# Patient Record
Sex: MALE | Race: WHITE | NOT HISPANIC OR LATINO | URBAN - NONMETROPOLITAN AREA
[De-identification: names, ages, dates, MRNs, and addresses within clinical notes are randomized per-mention and may not be internally consistent; named-entity substitution may affect disease eponyms.]

---

## 2024-10-03 ENCOUNTER — OFFICE VISIT (OUTPATIENT)
Dept: UROLOGY | Facility: CLINIC | Age: 84
End: 2024-10-03
Payer: MEDICARE

## 2024-10-03 VITALS
SYSTOLIC BLOOD PRESSURE: 200 MMHG | BODY MASS INDEX: 23.46 KG/M2 | HEIGHT: 68 IN | WEIGHT: 154.8 LBS | HEART RATE: 62 BPM | DIASTOLIC BLOOD PRESSURE: 90 MMHG

## 2024-10-03 DIAGNOSIS — R97.20 ELEVATED PROSTATE SPECIFIC ANTIGEN (PSA): Primary | ICD-10-CM

## 2024-10-03 PROCEDURE — 84153 ASSAY OF PSA TOTAL: CPT

## 2024-10-03 PROCEDURE — 84154 ASSAY OF PSA FREE: CPT

## 2024-10-03 RX ORDER — GEMFIBROZIL 600 MG/1
1 TABLET, FILM COATED ORAL DAILY
COMMUNITY
Start: 2024-07-09

## 2024-10-03 RX ORDER — TRAZODONE HYDROCHLORIDE 100 MG/1
100 TABLET ORAL NIGHTLY PRN
COMMUNITY
Start: 2024-07-25

## 2024-10-03 RX ORDER — ERGOCALCIFEROL 1.25 MG/1
1 CAPSULE, LIQUID FILLED ORAL WEEKLY
COMMUNITY
Start: 2024-07-25

## 2024-10-03 RX ORDER — DAPAGLIFLOZIN 10 MG/1
1 TABLET, FILM COATED ORAL DAILY
COMMUNITY
Start: 2024-07-25

## 2024-10-03 RX ORDER — GLIMEPIRIDE 4 MG/1
1 TABLET ORAL DAILY
COMMUNITY
Start: 2024-07-25

## 2024-10-03 RX ORDER — METFORMIN HYDROCHLORIDE EXTENDED-RELEASE TABLETS 500 MG/1
500 TABLET, FILM COATED, EXTENDED RELEASE ORAL
COMMUNITY

## 2024-10-03 RX ORDER — TAMSULOSIN HYDROCHLORIDE 0.4 MG/1
CAPSULE ORAL
COMMUNITY
Start: 2024-07-25

## 2024-10-03 RX ORDER — OLMESARTAN MEDOXOMIL 40 MG/1
1 TABLET ORAL DAILY
COMMUNITY
Start: 2024-09-26

## 2024-10-03 RX ORDER — FUROSEMIDE 40 MG
1 TABLET ORAL DAILY
COMMUNITY
Start: 2024-07-25

## 2024-10-03 RX ORDER — AMLODIPINE BESYLATE 10 MG/1
1 TABLET ORAL DAILY
COMMUNITY
Start: 2024-07-25

## 2024-10-03 RX ORDER — CLONIDINE HYDROCHLORIDE 0.1 MG/1
TABLET ORAL
COMMUNITY
Start: 2024-07-25

## 2024-10-03 RX ORDER — POTASSIUM CHLORIDE 750 MG/1
CAPSULE, EXTENDED RELEASE ORAL
COMMUNITY
Start: 2024-07-25

## 2024-10-03 NOTE — PROGRESS NOTES
"Chief Complaint:    Chief Complaint   Patient presents with    Elevated PSA       Vital Signs:   BP (!) 200/90   Pulse 62   Ht 172.7 cm (68\")   Wt 70.2 kg (154 lb 12.8 oz)   BMI 23.54 kg/m²   Body mass index is 23.54 kg/m².      HPI:  Jordy Hooks is a 83 y.o. male who presents today for initial evaluation     History of Present Illness  Mr. Hooks presents to the clinic for initial evaluation of elevated PSA.  He has been referred to us by ANANTH Salinas.  He has a past medical history significant for type 2 diabetes mellitus, hypertension, and Hodgkin lymphoma.  His blood pressure was slightly elevated in office today and I recommended to check routinely at home and take clonidine if needed.  Patient recently saw his primary care provider in July for dysuria and lower urinary tract symptoms.  He has been on Flomax but is unsure for how long.  He was given an antibiotic at that time due to concerns of a urinary tract infection however his urine was not sent off for culture.  Did have a PSA completed at that time that came back high at 13.75.  States his urinary tract symptoms have resolved entirely.  He gets up maybe 1-2 times throughout the night and denies any weak stream, hesitancy with urination, frequency, urgency, intermittency, or incomplete emptying.  He denies any known family history of prostate cancer.  He is unsure if he is ever had a PSA prior to this level.      Past Medical History:  Past Medical History:   Diagnosis Date    Diabetes mellitus     Hodgkin lymphoma     Hypertension        Current Meds:  Current Outpatient Medications   Medication Sig Dispense Refill    amLODIPine (NORVASC) 10 MG tablet Take 1 tablet by mouth Daily.      cloNIDine (CATAPRES) 0.1 MG tablet TAKE 1 TABLET BY MOUTH TWICE A DAY AS NEEDED FOR SYSTOLIC BLOOD PRESSURE >160      Farxiga 10 MG tablet Take 10 mg by mouth Daily.      furosemide (LASIX) 40 MG tablet Take 1 tablet by mouth Daily.      gemfibrozil " (LOPID) 600 MG tablet Take 1 tablet by mouth Daily.      glimepiride (AMARYL) 4 MG tablet Take 1 tablet by mouth Daily.      metFORMIN (FORTAMET) 500 MG (OSM) 24 hr tablet 1 tablet.      olmesartan (BENICAR) 40 MG tablet Take 1 tablet by mouth Daily.      potassium chloride (MICRO-K) 10 MEQ CR capsule TAKE 2 CAPSULES BY MOUTH EVERY MORNING & TAKE 1 CAPSULE BY MOUTH EVERY EVENING      tamsulosin (FLOMAX) 0.4 MG capsule 24 hr capsule TAKE 1 CAPSULE BY MOUTH ONCE DAILY 30 MINUTES AFTER MEAL      traZODone (DESYREL) 100 MG tablet Take 1 tablet by mouth At Night As Needed.      vitamin D (ERGOCALCIFEROL) 1.25 MG (82478 UT) capsule capsule Take 1 capsule by mouth 1 (One) Time Per Week.       No current facility-administered medications for this visit.        Allergies:   Allergies   Allergen Reactions    Ct Contrast Hives        Past Surgical History:  History reviewed. No pertinent surgical history.    Social History:  Social History     Socioeconomic History    Marital status: Unknown   Tobacco Use    Smoking status: Never     Passive exposure: Never    Smokeless tobacco: Never   Vaping Use    Vaping status: Never Used   Substance and Sexual Activity    Alcohol use: Never    Drug use: Never    Sexual activity: Defer       Family History:  Family History   Problem Relation Age of Onset    Cancer Father     No Known Problems Mother        Review of Systems:  Review of Systems   Constitutional:  Negative for fatigue, fever and unexpected weight change.   Respiratory:  Negative for chest tightness and shortness of breath.    Cardiovascular:  Negative for chest pain.   Gastrointestinal:  Negative for abdominal pain, constipation, diarrhea, nausea and vomiting.   Genitourinary:  Negative for difficulty urinating, dysuria, frequency and urgency.   Musculoskeletal:  Positive for back pain.   Skin:  Negative for rash.   Psychiatric/Behavioral:  Negative for confusion and suicidal ideas.        Physical Exam:  Physical  Exam  Constitutional:       General: He is not in acute distress.     Appearance: Normal appearance.   HENT:      Head: Normocephalic and atraumatic.      Nose: Nose normal.      Mouth/Throat:      Mouth: Mucous membranes are moist.   Eyes:      Conjunctiva/sclera: Conjunctivae normal.   Cardiovascular:      Rate and Rhythm: Normal rate and regular rhythm.      Pulses: Normal pulses.      Heart sounds: Normal heart sounds.   Pulmonary:      Effort: Pulmonary effort is normal.      Breath sounds: Normal breath sounds.   Abdominal:      General: Bowel sounds are normal.      Palpations: Abdomen is soft.   Genitourinary:     Comments: significantly enlarged prostate with some nodules noted on the right and left side.  No tenderness or bogginess to palpation.  Musculoskeletal:         General: Normal range of motion.      Cervical back: Normal range of motion.   Skin:     General: Skin is warm.   Neurological:      General: No focal deficit present.      Mental Status: He is alert and oriented to person, place, and time.   Psychiatric:         Mood and Affect: Mood normal.         Behavior: Behavior normal.         Thought Content: Thought content normal.         Judgment: Judgment normal.           Recent Image (CT and/or KUB):   CT Abdomen and Pelvis: No results found for this or any previous visit.     CT Stone Protocol: No results found for this or any previous visit.     KUB: No results found for this or any previous visit.       Labs:  Brief Urine Lab Results       None          No visits with results within 3 Month(s) from this visit.   Latest known visit with results is:   No results found for any previous visit.        Procedure: None  Procedures     I have reviewed and agree with the above PMH, PSH, FMH, social history, medications, allergies, and labs.     Assessment/Plan:   Problem List Items Addressed This Visit       Elevated prostate specific antigen (PSA) - Primary    Relevant Orders    PSA Total+% Free  (Serial)       Health Maintenance:   Health Maintenance Due   Topic Date Due    ZOSTER VACCINE (1 of 2) Never done    RSV Vaccine - Adults (1 - 1-dose 60+ series) Never done    Pneumococcal Vaccine 65+ (1 of 1 - PCV) Never done    INFLUENZA VACCINE  Never done    COVID-19 Vaccine (4 - 2023-24 season) 09/01/2024    ANNUAL PHYSICAL  Never done        Smoking Counseling: Never smoked.  Never used smokeless tobacco.  Counseling given.    Urine Incontinence: Patient reports that he is not currently experiencing any symptoms of urinary incontinence.    Patient was given instructions and counseling regarding his condition or for health maintenance advice. Please see specific information pulled into the AVS if appropriate.    Patient Education:   Prostatic enlargement/elevated PSA - discussed the pathophysiology of enlarged prostate and obstruction.  We discussed the static and dynamic effects of enlarged prostate as well as using 5 alpha reductase inhibitors versus alpha blockade.  We discussed the indications for transurethral surgery as well and/ or other therapeutic options available including all of the newer techniques.  Patient has been on Flomax with good resolution of lower urinary tract symptoms we will continue with this medication.  PSA testing -patient's most recent PSA was elevated at 13.75.  I am recommending repeat PSA free and total.  I discussed the pathophysiology of PSA testing indicating its use in the diagnosis and management of prostate cancer.  I discussed the normal range being 0 to 4, but more appropriately being much closer to 0 to 2 in a normal male.  I discussed the fact that after a certain age it is against recommendation to use PSA testing especially in view of numerous comorbidities.  I discussed many of the things that can artificially raise PSA including put not limited to a recent infection, urinary tract infection, recent sexual intercourse, or even the type of movement such as  manipulation of the prostate from riding a bicycle.  It was discussed that the most important use of PSA is the velocity measurement.  This refers to the change of PSA with time. I discussed that we look for greater than 20% rise over a year to help us make the prediction of prostate cancer.  I also discussed that in the case of prostate cancer indicating a radical prostatectomy, the PSA should be 0 and any rise indicates an early biochemical recurrence.  I will call patient with PSA results once available.  Did advise him if he remains elevated we can proceed forward with an MRI versus an office prostate biopsy.  Discussed the nature of both of these procedures in detail.  Discussed the risk and benefits of a prostate biopsy.  Did discuss the use of PI-RADS score and MRI including PI-RADS 2 was low risk for clinically significant prostate carcinoma, 3 is intermittent risk, and 4 and 5 is high risk leading directly to a prostate biopsy.  Given asymptomatic digital rectal exam however with prostatic enlargement and nodules this could be concerning for prostate cancer.  I will call them on Monday with PSA results and see him back pending this    Visit Diagnoses:    ICD-10-CM ICD-9-CM   1. Elevated prostate specific antigen (PSA)  R97.20 790.93     A total of 30 minutes were spent coordinating this patient’s care in clinic today; 15 minutes of which were face-to-face with the patient, reviewing medical history and counseling on the current treatment and followup plan.  All questions were answered to patient's satisfaction.    Meds Ordered During Visit:  No orders of the defined types were placed in this encounter.      Follow Up Appointment: Pending lab results  No follow-ups on file.      This document has been electronically signed by Jb Selby PA-C   October 3, 2024 09:25 EDT    Part of this note may be an electronic transcription/translation of spoken language to printed text using the Dragon Dictation  System.

## 2024-10-07 LAB
PSA FREE MFR SERPL: 14.1 %
PSA FREE SERPL-MCNC: 1.31 NG/ML
PSA SERPL-MCNC: 9.3 NG/ML (ref 0–4)

## 2024-10-08 ENCOUNTER — TELEPHONE (OUTPATIENT)
Dept: UROLOGY | Facility: CLINIC | Age: 84
End: 2024-10-08
Payer: MEDICARE

## 2024-10-08 NOTE — TELEPHONE ENCOUNTER
Called and advised patient that PSA result did decrease from 13.75-9.3.  Free percentage is 14% correlating to a 40 to 50% risk for prostate cancer.  Did recommend either repeat PSA closely in 3 months or MRI.  Patient wishes to repeat PSA and will schedule him back in 3 months for this.

## 2025-01-23 ENCOUNTER — OFFICE VISIT (OUTPATIENT)
Dept: UROLOGY | Facility: CLINIC | Age: 85
End: 2025-01-23
Payer: MEDICARE

## 2025-01-23 VITALS
HEIGHT: 68 IN | DIASTOLIC BLOOD PRESSURE: 98 MMHG | BODY MASS INDEX: 23.64 KG/M2 | SYSTOLIC BLOOD PRESSURE: 196 MMHG | WEIGHT: 156 LBS | HEART RATE: 57 BPM

## 2025-01-23 DIAGNOSIS — R97.20 ELEVATED PROSTATE SPECIFIC ANTIGEN (PSA): Primary | ICD-10-CM

## 2025-01-23 PROCEDURE — 84153 ASSAY OF PSA TOTAL: CPT

## 2025-01-23 PROCEDURE — 84154 ASSAY OF PSA FREE: CPT

## 2025-01-23 NOTE — PROGRESS NOTES
Chief Complaint:      Chief Complaint   Patient presents with    Elevated PSA     3 month follow up        HPI:   84 y.o. male.    Past Medical History:     Past Medical History:   Diagnosis Date    Diabetes mellitus     Hodgkin lymphoma     Hypertension        Current Meds:     Current Outpatient Medications   Medication Sig Dispense Refill    amLODIPine (NORVASC) 10 MG tablet Take 1 tablet by mouth Daily.      cloNIDine (CATAPRES) 0.1 MG tablet TAKE 1 TABLET BY MOUTH TWICE A DAY AS NEEDED FOR SYSTOLIC BLOOD PRESSURE >160      Farxiga 10 MG tablet Take 10 mg by mouth Daily.      furosemide (LASIX) 40 MG tablet Take 1 tablet by mouth Daily.      gemfibrozil (LOPID) 600 MG tablet Take 1 tablet by mouth Daily.      glimepiride (AMARYL) 4 MG tablet Take 1 tablet by mouth Daily.      metFORMIN (FORTAMET) 500 MG (OSM) 24 hr tablet 1 tablet.      olmesartan (BENICAR) 40 MG tablet Take 1 tablet by mouth Daily.      potassium chloride (MICRO-K) 10 MEQ CR capsule TAKE 2 CAPSULES BY MOUTH EVERY MORNING & TAKE 1 CAPSULE BY MOUTH EVERY EVENING      tamsulosin (FLOMAX) 0.4 MG capsule 24 hr capsule TAKE 1 CAPSULE BY MOUTH ONCE DAILY 30 MINUTES AFTER MEAL      traZODone (DESYREL) 100 MG tablet Take 1 tablet by mouth At Night As Needed.      vitamin D (ERGOCALCIFEROL) 1.25 MG (41388 UT) capsule capsule Take 1 capsule by mouth 1 (One) Time Per Week.       No current facility-administered medications for this visit.        Allergies:      Allergies   Allergen Reactions    Ct Contrast Hives        Past Surgical History:     No past surgical history on file.    Social History:     Social History     Socioeconomic History    Marital status: Unknown   Tobacco Use    Smoking status: Never     Passive exposure: Never    Smokeless tobacco: Never   Vaping Use    Vaping status: Never Used   Substance and Sexual Activity    Alcohol use: Never    Drug use: Never    Sexual activity: Defer       Family History:     Family History   Problem  Relation Age of Onset    Cancer Father     No Known Problems Mother        Review of Systems:     Review of Systems    Physical Exam:     Physical Exam    ***  Recent Image (CT and/or KUB):      CT Abdomen and Pelvis: No results found for this or any previous visit.       CT Stone Protocol: No results found for this or any previous visit.       KUB: No results found for this or any previous visit.       Labs (past 3 months):      No visits with results within 3 Month(s) from this visit.   Latest known visit with results is:   Office Visit on 10/03/2024   Component Date Value Ref Range Status    PSA 10/03/2024 9.3 (H)  0.0 - 4.0 ng/mL Final    Roche ECLIA methodology.  According to the American [employer] Association, Serum PSA should  decrease and remain at undetectable levels after radical  prostatectomy. The AUA defines biochemical recurrence as an initial  PSA value 0.2 ng/mL or greater followed by a subsequent confirmatory  PSA value 0.2 ng/mL or greater.  Values obtained with different assay methods or kits cannot be used  interchangeably. Results cannot be interpreted as absolute evidence  of the presence or absence of malignant disease.    PSA, Free 10/03/2024 1.31  N/A ng/mL Final    Roche ECLIA methodology.    PSA, Free % 10/03/2024 14.1  % Final    The table below lists the probability of prostate cancer for  men with non-suspicious PARVIN results and total PSA between  4 and 10 ng/mL, by patient age (Harrison et al, ANDREW 1998,  279:[id number]).                    % Free PSA       50-64 yr        65-75 yr                    0.00-10.00%        56%             55%                   10.01-15.00%        24%             35%                   15.01-20.00%        17%             23%                   20.01-25.00%        10%             20%                        >25.00%         5%              9%  Please note:  aHrrison et al did not make specific                recommendations regarding the use of                percent  free PSA for any other population                of men.        Procedure:       Assessment/Plan:   ***     Patient reports that he is not currently experiencing any symptoms of urinary incontinence.      BMI is within normal parameters. No other follow-up for BMI required.              This document has been electronically signed by ELENA WEBSTER MD January 23, 2025 08:41 EST    Dictated Utilizing Dragon Dictation: Part of this note may be an electronic transcription/translation of spoken language to printed text using the Dragon Dictation System.

## 2025-01-23 NOTE — PROGRESS NOTES
"Chief Complaint:    Chief Complaint   Patient presents with    Elevated PSA     3 month follow up        Vital Signs:   BP (!) 196/98 (BP Location: Right arm, Patient Position: Sitting, Cuff Size: Adult)   Pulse 57   Ht 172.7 cm (68\")   Wt 70.8 kg (156 lb)   BMI 23.72 kg/m²   Body mass index is 23.72 kg/m².      HPI:  Jordy Hooks is a 84 y.o. male who presents today for follow up    History of Present Illness  Mr. Hooks presents to the clinic for follow-up for elevated PSA.  He was initially seen in October referred a PSA taken by his primary care provider in July 2023 that came back high at greater than 13.  He had a repeat PSA free and total complete last office visit that decreased to 9.3 with a roughly 14% free percentage.  He is currently taking Flomax.  He denies any changes in lower urinary tract symptoms since last office visit.  He currently has an IPSS score of 2 he does not get up at all throughout the night.  He is pleased with urinary tract symptoms.  He denies any fever, chills, or unexplained weight loss.      Past Medical History:  Past Medical History:   Diagnosis Date    Diabetes mellitus     Hodgkin lymphoma     Hypertension        Current Meds:  Current Outpatient Medications   Medication Sig Dispense Refill    amLODIPine (NORVASC) 10 MG tablet Take 1 tablet by mouth Daily.      cloNIDine (CATAPRES) 0.1 MG tablet TAKE 1 TABLET BY MOUTH TWICE A DAY AS NEEDED FOR SYSTOLIC BLOOD PRESSURE >160      Farxiga 10 MG tablet Take 10 mg by mouth Daily.      furosemide (LASIX) 40 MG tablet Take 1 tablet by mouth Daily.      gemfibrozil (LOPID) 600 MG tablet Take 1 tablet by mouth Daily.      glimepiride (AMARYL) 4 MG tablet Take 1 tablet by mouth Daily.      metFORMIN (FORTAMET) 500 MG (OSM) 24 hr tablet 1 tablet.      olmesartan (BENICAR) 40 MG tablet Take 1 tablet by mouth Daily.      potassium chloride (MICRO-K) 10 MEQ CR capsule TAKE 2 CAPSULES BY MOUTH EVERY MORNING & TAKE 1 CAPSULE BY MOUTH " EVERY EVENING      tamsulosin (FLOMAX) 0.4 MG capsule 24 hr capsule TAKE 1 CAPSULE BY MOUTH ONCE DAILY 30 MINUTES AFTER MEAL      traZODone (DESYREL) 100 MG tablet Take 1 tablet by mouth At Night As Needed.      vitamin D (ERGOCALCIFEROL) 1.25 MG (87751 UT) capsule capsule Take 1 capsule by mouth 1 (One) Time Per Week.       No current facility-administered medications for this visit.        Allergies:   Allergies   Allergen Reactions    Ct Contrast Hives        Past Surgical History:  History reviewed. No pertinent surgical history.    Social History:  Social History     Socioeconomic History    Marital status: Unknown   Tobacco Use    Smoking status: Never     Passive exposure: Never    Smokeless tobacco: Never   Vaping Use    Vaping status: Never Used   Substance and Sexual Activity    Alcohol use: Never    Drug use: Never    Sexual activity: Defer       Family History:  Family History   Problem Relation Age of Onset    Cancer Father     No Known Problems Mother        Review of Systems:  Review of Systems   Constitutional:  Negative for chills, fatigue and fever.   HENT:  Positive for sinus pressure. Negative for congestion.    Respiratory:  Positive for shortness of breath. Negative for chest tightness.    Cardiovascular:  Negative for chest pain.   Gastrointestinal:  Negative for abdominal pain, constipation, diarrhea, nausea and vomiting.   Genitourinary:  Positive for frequency. Negative for difficulty urinating, flank pain, hematuria and urgency.   Musculoskeletal:  Positive for back pain. Negative for neck pain.   Skin:  Negative for rash.   Allergic/Immunologic: Negative for food allergies.   Neurological:  Negative for dizziness and headaches.   Hematological:  Bruises/bleeds easily.   Psychiatric/Behavioral:  The patient is nervous/anxious.        Physical Exam:  Physical Exam    IPSS Questionnaire (AUA-7):  IPSS Questionnaire (AUA-7):                  IPSS Questionnaire (AUA-7):  Over the past month…     1)  Incomplete Emptying  How often have you had a sensation of not emptying your bladder?  1 - Less than 1 time in 5   2)  Frequency  How often have you had to urinate less than every two hours? 1 - Less than 1 time in 5   3)  Intermittency  How often have you found you stopped and started again several times when you urinated?  0 - Not at all   4) Urgency  How often have you found it difficult to postpone urination?  0 - Not at all   5) Weak Stream  How often have you had a weak urinary stream?  0 - Not at all   6) Straining  How often have you had to push or strain to begin urination?  0 - Not at all   7) Nocturia  How many times did you typically get up at night to urinate?  0 - None   Total Score:  2   The International Prostate Symptom Score (IPSS) is used to screen, diagnose, track symptoms of benign prostatic hyperplasia (BPH).    0-7 pts (Mild Symptoms)  / 8-19 pts (Moderate) / 20-35 (Severe)    Quality of life due to urinary symptoms:  If you were to spend the rest of your life with your urinary condition the way it is now, how would you feel about that? 2-Mostly Satisfied   Urine Leakage (Incontinence) 0-No Leakage       Recent Image (CT and/or KUB):   CT Abdomen and Pelvis: No results found for this or any previous visit.     CT Stone Protocol: No results found for this or any previous visit.     KUB: No results found for this or any previous visit.       Labs:  Brief Urine Lab Results       None          No visits with results within 3 Month(s) from this visit.   Latest known visit with results is:   Office Visit on 10/03/2024   Component Date Value Ref Range Status    PSA 10/03/2024 9.3 (H)  0.0 - 4.0 ng/mL Final    Roche ECLIA methodology.  According to the American Urological Association, Serum PSA should  decrease and remain at undetectable levels after radical  prostatectomy. The AUA defines biochemical recurrence as an initial  PSA value 0.2 ng/mL or greater followed by a subsequent  confirmatory  PSA value 0.2 ng/mL or greater.  Values obtained with different assay methods or kits cannot be used  interchangeably. Results cannot be interpreted as absolute evidence  of the presence or absence of malignant disease.    PSA, Free 10/03/2024 1.31  N/A ng/mL Final    Roche ECLIA methodology.    PSA, Free % 10/03/2024 14.1  % Final    The table below lists the probability of prostate cancer for  men with non-suspicious PARVIN results and total PSA between  4 and 10 ng/mL, by patient age (Harrison et al, ANDREW 1998,  279:1542).                    % Free PSA       50-64 yr        65-75 yr                    0.00-10.00%        56%             55%                   10.01-15.00%        24%             35%                   15.01-20.00%        17%             23%                   20.01-25.00%        10%             20%                        >25.00%         5%              9%  Please note:  Harrison et al did not make specific                recommendations regarding the use of                percent free PSA for any other population                of men.        Procedure: None  Procedures     I have reviewed and agree with the above PMH, PSH, FMH, social history, medications, allergies, and labs.     Assessment/Plan:   Problem List Items Addressed This Visit       Elevated prostate specific antigen (PSA) - Primary    Relevant Orders    PSA Total+% Free (Serial)       Health Maintenance:   Health Maintenance Due   Topic Date Due    ZOSTER VACCINE (1 of 2) Never done    Pneumococcal Vaccine 65+ (1 of 1 - PCV) Never done    RSV Vaccine - Adults (1 - 1-dose 75+ series) Never done    INFLUENZA VACCINE  Never done    COVID-19 Vaccine (4 - 2024-25 season) 09/01/2024    ANNUAL WELLNESS VISIT  Never done        Smoking Counseling: Never smoked or used smokeless tobacco.  Counseling given.    Urine Incontinence: Patient reports that he is not currently experiencing any symptoms of urinary incontinence.    Patient was  given instructions and counseling regarding his condition or for health maintenance advice. Please see specific information pulled into the AVS if appropriate.    Patient Education:   Elevated PSA -discussed with the patient the pathophysiology of this condition in detail.  He has had elevated PSA as high as 13.  Most recent PSA decreased to roughly 9.3.  I am recommending repeat PSA free and total in office today.  Given patient's current age this could be source of elevated PSA however underlying carcinoma cannot be ruled out.  Did discuss further workup including a MRI of the abdomen pelvis with and without contrast versus an office biopsy.  I will call patient with PSA results once available.  Did discuss the use of a PI-RADS score including 2 through 5.  Advised him a 2 is low risk for clinically significant prostate carcinoma, 3 is intermittent risk, informed 5 is high risk leading directly to an office biopsy.  Did discuss the risk of any office biopsy in detail as well including infection and bleeding.  Discussed the use of medications preop for infection prophylaxis.  Otherwise we will place him back tentatively in 6 months pending PSA.  He verbalized understanding.    Visit Diagnoses:    ICD-10-CM ICD-9-CM   1. Elevated prostate specific antigen (PSA)  R97.20 790.93     A total of 20 minutes were spent coordinating this patient’s care in clinic today; 12 minutes of which were face-to-face with the patient, reviewing medical history and counseling on the current treatment and followup plan.  All questions were answered to patient's satisfaction.    Meds Ordered During Visit:  No orders of the defined types were placed in this encounter.      Follow Up Appointment: 6 months  No follow-ups on file.      This document has been electronically signed by Jb Selby PA-C   January 23, 2025 09:03 EST    Part of this note may be an electronic transcription/translation of spoken language to printed text using  the Dragon Dictation System.

## 2025-01-24 LAB
PSA FREE MFR SERPL: 14.2 %
PSA FREE SERPL-MCNC: 1.22 NG/ML
PSA SERPL-MCNC: 8.6 NG/ML (ref 0–4)

## 2025-01-27 ENCOUNTER — TELEPHONE (OUTPATIENT)
Dept: UROLOGY | Facility: CLINIC | Age: 85
End: 2025-01-27
Payer: MEDICARE

## 2025-01-27 NOTE — TELEPHONE ENCOUNTER
Called patient advised him PSA did slightly decreased but remain elevated.  I recommend to proceed forward with close observation with a repeat PSA in 6 months, biopsy, or MRI.  Patient wished to proceed forward with close observation.  Did discuss the risk of delayed diagnosis and he verbalized understanding.

## 2025-02-18 ENCOUNTER — TELEPHONE (OUTPATIENT)
Dept: UROLOGY | Facility: CLINIC | Age: 85
End: 2025-02-18
Payer: MEDICARE

## 2025-02-18 DIAGNOSIS — R97.20 ELEVATED PROSTATE SPECIFIC ANTIGEN (PSA): Primary | ICD-10-CM

## 2025-02-18 NOTE — TELEPHONE ENCOUNTER
Received fax of patient's most recent labs showing his PSA 10.76 dated 2/15/25. Indexed into chart.

## 2025-03-03 ENCOUNTER — HOSPITAL ENCOUNTER (OUTPATIENT)
Dept: MRI IMAGING | Facility: HOSPITAL | Age: 85
Discharge: HOME | End: 2025-03-03
Payer: MEDICARE

## 2025-03-03 DIAGNOSIS — R97.20 ELEVATED PROSTATE SPECIFIC ANTIGEN (PSA): ICD-10-CM

## 2025-03-03 PROCEDURE — 72197 MRI PELVIS W/O & W/DYE: CPT

## 2025-03-03 PROCEDURE — A9577 INJ MULTIHANCE: HCPCS

## 2025-03-03 PROCEDURE — 82565 ASSAY OF CREATININE: CPT

## 2025-03-03 PROCEDURE — 25510000002 GADOBENATE DIMEGLUMINE 529 MG/ML SOLUTION

## 2025-03-03 RX ADMIN — GADOBENATE DIMEGLUMINE 14 ML: 529 INJECTION, SOLUTION INTRAVENOUS at 13:03

## 2025-03-04 ENCOUNTER — TELEPHONE (OUTPATIENT)
Dept: UROLOGY | Facility: CLINIC | Age: 85
End: 2025-03-04
Payer: MEDICARE

## 2025-03-04 DIAGNOSIS — R97.20 ELEVATED PROSTATE SPECIFIC ANTIGEN (PSA): Primary | ICD-10-CM

## 2025-03-04 RX ORDER — DIAZEPAM 10 MG/1
TABLET ORAL
Qty: 2 TABLET | Refills: 0 | Status: SHIPPED | OUTPATIENT
Start: 2025-03-04

## 2025-03-04 RX ORDER — CLINDAMYCIN HYDROCHLORIDE 300 MG/1
300 CAPSULE ORAL 2 TIMES DAILY
Qty: 6 CAPSULE | Refills: 0 | Status: SHIPPED | OUTPATIENT
Start: 2025-03-04 | End: 2025-03-07

## 2025-03-04 RX ORDER — CIPROFLOXACIN 500 MG/1
TABLET, FILM COATED ORAL
Qty: 4 TABLET | Refills: 0 | Status: SHIPPED | OUTPATIENT
Start: 2025-03-04

## 2025-03-04 RX ORDER — SODIUM PHOSPHATE,MONO-DIBASIC 19G-7G/118
ENEMA (ML) RECTAL
Qty: 1 ENEMA | Refills: 0 | Status: SHIPPED | OUTPATIENT
Start: 2025-03-04

## 2025-03-04 NOTE — TELEPHONE ENCOUNTER
Called patient advised that MRI showed concerns of prostatic carcinoma.  Did recommend undergo a biopsy in office and patient does agree to proceed forward with this.  I will go ahead and send in his antibiotics and we will get him scheduled soon as possible.  Given concerns of uptake in the hip I will order nuclear medicine study for him to have completed.

## 2025-03-05 LAB — CREAT BLDA-MCNC: 1.3 MG/DL (ref 0.6–1.3)

## 2025-03-18 ENCOUNTER — HOSPITAL ENCOUNTER (OUTPATIENT)
Dept: NUCLEAR MEDICINE | Facility: HOSPITAL | Age: 85
Discharge: HOME | End: 2025-03-18
Payer: MEDICARE

## 2025-03-18 DIAGNOSIS — R97.20 ELEVATED PROSTATE SPECIFIC ANTIGEN (PSA): ICD-10-CM

## 2025-03-18 PROCEDURE — 34310000005 TECHNETIUM OXIDRONATE KIT

## 2025-03-18 PROCEDURE — A9561 TC99M OXIDRONATE: HCPCS

## 2025-03-18 PROCEDURE — 78306 BONE IMAGING WHOLE BODY: CPT

## 2025-03-18 RX ADMIN — TECHNETIUM TC 99M OXIDRONATE 1 DOSE: 3.15 INJECTION, POWDER, LYOPHILIZED, FOR SOLUTION INTRAVENOUS at 12:27

## 2025-03-20 ENCOUNTER — TELEPHONE (OUTPATIENT)
Dept: UROLOGY | Facility: CLINIC | Age: 85
End: 2025-03-20
Payer: MEDICARE

## 2025-03-20 ENCOUNTER — RESULTS FOLLOW-UP (OUTPATIENT)
Dept: NUCLEAR MEDICINE | Facility: HOSPITAL | Age: 85
End: 2025-03-20
Payer: MEDICARE

## 2025-03-20 NOTE — TELEPHONE ENCOUNTER
I called the pt and spoke to his wife  letting her know that Jordy's nuclear medicine bone scan came back good with no concerns. She verbalized understanding.

## 2025-03-20 NOTE — TELEPHONE ENCOUNTER
Tempted to call patient about nuclear medicine bone scan however he did not answer.  Did leave a voicemail advising scan came back good with no concerns.  Recommended to keep biopsy appointment.

## 2025-05-01 ENCOUNTER — PROCEDURE VISIT (OUTPATIENT)
Dept: UROLOGY | Facility: CLINIC | Age: 85
End: 2025-05-01
Payer: MEDICARE

## 2025-05-01 VITALS
HEART RATE: 51 BPM | WEIGHT: 157 LBS | SYSTOLIC BLOOD PRESSURE: 135 MMHG | BODY MASS INDEX: 23.79 KG/M2 | DIASTOLIC BLOOD PRESSURE: 85 MMHG | HEIGHT: 68 IN

## 2025-05-01 DIAGNOSIS — R97.20 ELEVATED PROSTATE SPECIFIC ANTIGEN (PSA): Primary | ICD-10-CM

## 2025-05-01 RX ORDER — GENTAMICIN 40 MG/ML
80 INJECTION, SOLUTION INTRAMUSCULAR; INTRAVENOUS ONCE
Status: COMPLETED | OUTPATIENT
Start: 2025-05-01 | End: 2025-05-01

## 2025-05-01 RX ADMIN — GENTAMICIN 80 MG: 40 INJECTION, SOLUTION INTRAMUSCULAR; INTRAVENOUS at 13:33

## 2025-05-01 NOTE — PROGRESS NOTES
Chief Complaint:      Chief Complaint   Patient presents with    Elevated PSA     Prostate bx        HPI:   84 y.o. male here for biopsy.  His PSA was 8.6, has MRI showed a PI-RADS 4, he has a right inguinal hernia bigger than his left inguinal hernia.    Past Medical History:     Past Medical History:   Diagnosis Date    Diabetes mellitus     Hodgkin lymphoma     Hypertension        Current Meds:     Current Outpatient Medications   Medication Sig Dispense Refill    amLODIPine (NORVASC) 10 MG tablet Take 1 tablet by mouth Daily.      ciprofloxacin (Cipro) 500 MG tablet Take one tablet twice a day before procedure 4 tablet 0    cloNIDine (CATAPRES) 0.1 MG tablet TAKE 1 TABLET BY MOUTH TWICE A DAY AS NEEDED FOR SYSTOLIC BLOOD PRESSURE >160      diazePAM (VALIUM) 10 MG tablet One tablet night before procedure at bedtime and one morning of one hour prior to procedure 2 tablet 0    Farxiga 10 MG tablet Take 10 mg by mouth Daily.      furosemide (LASIX) 40 MG tablet Take 1 tablet by mouth Daily.      gemfibrozil (LOPID) 600 MG tablet Take 1 tablet by mouth Daily.      glimepiride (AMARYL) 4 MG tablet Take 1 tablet by mouth Daily.      metFORMIN (FORTAMET) 500 MG (OSM) 24 hr tablet 1 tablet.      olmesartan (BENICAR) 40 MG tablet Take 1 tablet by mouth Daily.      potassium chloride (MICRO-K) 10 MEQ CR capsule TAKE 2 CAPSULES BY MOUTH EVERY MORNING & TAKE 1 CAPSULE BY MOUTH EVERY EVENING      Sodium Phosphates ([employer] Enema Disposable) 19-7 GM/118ML enema Use morning of the procedure 1 enema 0    tamsulosin (FLOMAX) 0.4 MG capsule 24 hr capsule TAKE 1 CAPSULE BY MOUTH ONCE DAILY 30 MINUTES AFTER MEAL      traZODone (DESYREL) 100 MG tablet Take 1 tablet by mouth At Night As Needed.      vitamin D (ERGOCALCIFEROL) 1.25 MG (05319 UT) capsule capsule Take 1 capsule by mouth 1 (One) Time Per Week.       No current facility-administered medications for this visit.        Allergies:      Allergies   Allergen Reactions    Ct  Contrast Hives        Past Surgical History:     History reviewed. No pertinent surgical history.    Social History:     Social History     Socioeconomic History    Marital status:    Tobacco Use    Smoking status: Never     Passive exposure: Never    Smokeless tobacco: Never   Vaping Use    Vaping status: Never Used   Substance and Sexual Activity    Alcohol use: Never    Drug use: Never    Sexual activity: Defer       Family History:     Family History   Problem Relation Age of Onset    Cancer Father     No Known Problems Mother        Review of Systems:     Review of Systems   Constitutional: Negative.    HENT: Negative.     Eyes: Negative.    Respiratory: Negative.     Cardiovascular: Negative.    Gastrointestinal: Negative.    Endocrine: Negative.    Musculoskeletal: Negative.    Allergic/Immunologic: Negative.    Neurological: Negative.    Hematological: Negative.    Psychiatric/Behavioral: Negative.         Physical Exam:     Physical Exam  Vitals and nursing note reviewed.   Constitutional:       Appearance: He is well-developed.   HENT:      Head: Normocephalic and atraumatic.   Eyes:      Conjunctiva/sclera: Conjunctivae normal.      Pupils: Pupils are equal, round, and reactive to light.   Cardiovascular:      Rate and Rhythm: Normal rate and regular rhythm.      Heart sounds: Normal heart sounds.   Pulmonary:      Effort: Pulmonary effort is normal.      Breath sounds: Normal breath sounds.   Abdominal:      General: Bowel sounds are normal.      Palpations: Abdomen is soft.   Genitourinary:     Comments: Bilateral inguinal hernias right greater than left  Musculoskeletal:         General: Normal range of motion.      Cervical back: Normal range of motion.   Skin:     General: Skin is warm and dry.   Neurological:      Mental Status: He is alert and oriented to person, place, and time.      Deep Tendon Reflexes: Reflexes are normal and symmetric.   Psychiatric:         Behavior: Behavior normal.          Thought Content: Thought content normal.         Judgment: Judgment normal.         I have reviewed the following portions of the patient's history: Allergies, current medications, past family history, past medical history, past social history, past surgical history, problem list, and ROS and confirm it is accurate.    Recent Image (CT and/or KUB):      CT Abdomen and Pelvis: No results found for this or any previous visit.       CT Stone Protocol: No results found for this or any previous visit.       KUB: No results found for this or any previous visit.       Labs (past 3 months):      Hospital Outpatient Visit on 03/03/2025   Component Date Value Ref Range Status    Creatinine 03/03/2025 1.30  0.60 - 1.30 mg/dL Final    Serial Number: [id number]:  [id number]        Procedure:   Prostate ultrasound and biopsy:  84 year-old white male with a history of an elevated PSA and a significant increase in his risk for prostate cancer.  We discussed the prostate biopsy at length.  We discussed the significant risks of anesthesia, bleeding, infection, urosepsis, purported effects on erectile function, and rectal bleeding requiring surgical intervention.  He was given a pre-procedural enema.  He was pretreated with ciprofloxacin for 3 days.  He was given periprocedural gentamicin at the dose of 80 mg in an intramuscular fashion and given post biopsy clindamycin for 3 days.  Following an extensive informed consent, he was brought to the operative suite, placed in the left lateral decubitus position.  He was given his prophylactic gentamicin.  The rectum was anesthetized with 20 mL of 2% viscous Xylocaine jelly.  I then used the BK biplanar probe inserted into the rectum and made measurements of the prostate.  The height was 3.90 cm, the width was 4.52 cm, and the length was 5.06 cm for a volume of 46.38 g.  There were no obvious hypoechoic areas.  There was no intravesical median lobe.  There was minimal calcification  between the transition and peripheral zone.  I then injected 20 mL of 1% Xylocaine in the perirectal area and raised of skin wheal to provide anesthesia after an adequate period of topical anesthesia. I used the Biopty gun to take a total of 10 cores without complication.  He tolerated this extremely well.  Cores.  There was no bleeding or complications.   Impression: Elevated PSA s/p biopsy.    Assessment/Plan:   Elevated prostate specific antigen-we discussed the diagnosis of elevated prostate-specific antigen.  I explained the pathophysiology of PSA.  It is a serine protease that's function in the male reproductive tract is to facilitate the liquefaction of semen.  It is for this reason the body does not want it freely floating in the serum and why typically bound tightly to albumin.  We discussed why we used both a PSA free and total to determine the need for more aggressive therapy. I discussed the normal range.  Additionally, it was in the range of 1 to 4, but more recently has been downgraded to something less than 2 or even approaching 1.  I discussed the risk of family history, particularly the fact that the average male has a 14% risk of prostate cancer and that in the face of a positive diagnosis in a father it will tablet and any other first-generation relative continued tablet insofar that a father and brother with prostate cancer will produce almost a 50% risk of prostate cancer.  I discussed the use of the temporal use of PSA as the best option for monitoring.  We also discussed the fact that an elevated PSA is an isolated event does not mean that this is prostate cancer and should not engender worry in this regard. I discussed other things that can elevate PSA including constipation, prostatitis, infection, recent intercourse etc., as well as the risks and benefits associated with this.  Also discussed the fact that this is with a dilutional test and as a consequence of such were present produce  slight variations on a single specimen.  Further discussed the risks and benefits of a prostate biopsy as well.  Biopsy results          This document has been electronically signed by ELENA WEBSTER MD May 1, 2025 13:32 EDT    Dictated Utilizing Dragon Dictation: Part of this note may be an electronic transcription/translation of spoken language to printed text using the Dragon Dictation System.

## 2025-05-05 LAB — REF LAB TEST METHOD: NORMAL

## 2025-05-08 ENCOUNTER — OFFICE VISIT (OUTPATIENT)
Dept: UROLOGY | Facility: CLINIC | Age: 85
End: 2025-05-08
Payer: MEDICARE

## 2025-05-08 VITALS
HEART RATE: 59 BPM | HEIGHT: 68 IN | BODY MASS INDEX: 23.52 KG/M2 | WEIGHT: 155.2 LBS | SYSTOLIC BLOOD PRESSURE: 179 MMHG | DIASTOLIC BLOOD PRESSURE: 80 MMHG

## 2025-05-08 DIAGNOSIS — C61 PROSTATE CANCER: Primary | ICD-10-CM

## 2025-05-08 NOTE — PROGRESS NOTES
Chief Complaint:      Chief Complaint   Patient presents with    Biopsy Results       HPI:   84 y.o. male returns today he has a positive biopsy.  He has a history of lymphoma he sees Dr. Barraza in [place] he is currently in remission he has had chemotherapy he is going to go ahead get a staging workup and I will see him after to discuss x-ray.  I am also going to get an Oncotype on his tissue today.  He had no biopsy complications.    Past Medical History:     Past Medical History:   Diagnosis Date    Diabetes mellitus     Hodgkin lymphoma     Hypertension        Current Meds:     Current Outpatient Medications   Medication Sig Dispense Refill    amLODIPine (NORVASC) 10 MG tablet Take 1 tablet by mouth Daily.      cloNIDine (CATAPRES) 0.1 MG tablet TAKE 1 TABLET BY MOUTH TWICE A DAY AS NEEDED FOR SYSTOLIC BLOOD PRESSURE >160      Farxiga 10 MG tablet Take 10 mg by mouth Daily.      furosemide (LASIX) 40 MG tablet Take 1 tablet by mouth Daily.      gemfibrozil (LOPID) 600 MG tablet Take 1 tablet by mouth Daily.      glimepiride (AMARYL) 4 MG tablet Take 1 tablet by mouth Daily.      metFORMIN (FORTAMET) 500 MG (OSM) 24 hr tablet 1 tablet.      olmesartan (BENICAR) 40 MG tablet Take 1 tablet by mouth Daily.      potassium chloride (MICRO-K) 10 MEQ CR capsule TAKE 2 CAPSULES BY MOUTH EVERY MORNING & TAKE 1 CAPSULE BY MOUTH EVERY EVENING      Sodium Phosphates ([employer] Enema Disposable) 19-7 GM/118ML enema Use morning of the procedure 1 enema 0    tamsulosin (FLOMAX) 0.4 MG capsule 24 hr capsule TAKE 1 CAPSULE BY MOUTH ONCE DAILY 30 MINUTES AFTER MEAL      traZODone (DESYREL) 100 MG tablet Take 1 tablet by mouth At Night As Needed.      vitamin D (ERGOCALCIFEROL) 1.25 MG (79358 UT) capsule capsule Take 1 capsule by mouth 1 (One) Time Per Week.      ciprofloxacin (Cipro) 500 MG tablet Take one tablet twice a day before procedure (Patient not taking: Reported on 5/8/2025) 4 tablet 0    diazePAM (VALIUM) 10 MG tablet One  tablet night before procedure at bedtime and one morning of one hour prior to procedure (Patient not taking: Reported on 5/8/2025) 2 tablet 0     No current facility-administered medications for this visit.        Allergies:      Allergies   Allergen Reactions    Ct Contrast Hives        Past Surgical History:     History reviewed. No pertinent surgical history.    Social History:     Social History     Socioeconomic History    Marital status:    Tobacco Use    Smoking status: Never     Passive exposure: Never    Smokeless tobacco: Never   Vaping Use    Vaping status: Never Used   Substance and Sexual Activity    Alcohol use: Never    Drug use: Never    Sexual activity: Defer       Family History:     Family History   Problem Relation Age of Onset    Cancer Father     No Known Problems Mother        Review of Systems:     Review of Systems   Constitutional: Negative.    HENT: Negative.     Eyes: Negative.    Respiratory: Negative.     Cardiovascular: Negative.    Gastrointestinal: Negative.    Endocrine: Negative.    Musculoskeletal: Negative.    Allergic/Immunologic: Negative.    Neurological: Negative.    Hematological: Negative.    Psychiatric/Behavioral: Negative.         Physical Exam:     Physical Exam  Vitals and nursing note reviewed.   Constitutional:       Appearance: He is well-developed.   HENT:      Head: Normocephalic and atraumatic.   Eyes:      Conjunctiva/sclera: Conjunctivae normal.      Pupils: Pupils are equal, round, and reactive to light.   Cardiovascular:      Rate and Rhythm: Normal rate and regular rhythm.      Heart sounds: Normal heart sounds.   Pulmonary:      Effort: Pulmonary effort is normal.      Breath sounds: Normal breath sounds.   Abdominal:      General: Bowel sounds are normal.      Palpations: Abdomen is soft.   Musculoskeletal:         General: Normal range of motion.      Cervical back: Normal range of motion.   Skin:     General: Skin is warm and dry.   Neurological:       Mental Status: He is alert and oriented to person, place, and time.      Deep Tendon Reflexes: Reflexes are normal and symmetric.   Psychiatric:         Behavior: Behavior normal.         Thought Content: Thought content normal.         Judgment: Judgment normal.         I have reviewed the following portions of the patient's history: Allergies, current medications, past family history, past medical history, past social history, past surgical history, problem list, and ROS and confirm it is accurate.    Recent Image (CT and/or KUB):      CT Abdomen and Pelvis: No results found for this or any previous visit.       CT Stone Protocol: No results found for this or any previous visit.       KUB: No results found for this or any previous visit.       Labs (past 3 months):      Procedure visit on 2025   Component Date Value Ref Range Status    Reference Lab Report 2025    Final                    Value:[facility]  [address] [place] [address]  Phone: [phone] or [phone]  Fax: [phone]  Arjun Barros M.D., Medical Director    PATIENT NAME                           LABORATORY NO.  LANDON TODD                       [id number]  [id number]                         AGE              SEX  SSN           CLIENT REF #  [facility]                     84      1940      [id number]   [id number]    GASTROENTEROLOGY & UROLOGY         REQUESTING M.D.     ATTENDING M.D.     COPY TO.  MARY ALICE WEBSTER,  [address] [place] [id number]                   DATE COLLECTED      DATE RECEIVED      DATE REPORTED  2025    DIAGNOSIS:  A.   PROSTATE, NEEDLE CORE BIOPSIES, RIGHT MID:  Prostatic acinar adenocarcinoma, grade group 1 (H[place]ton 3+3), involving  100% of specimen  B.   PROSTATE, NEEDLE CORE BIOPSIES, RIGHT BASE:  Benign                            prostatic tissue  C.   PROSTATE, NEEDLE CORE BIOPSIES, RIGHT LATERAL MID:  Prostatic adenocarcinoma, grade group 1 (Himanshu 3+3), involving 15%  of specimen  Focal chronic inflammation  D.   PROSTATE, NEEDLE CORE BIOPSIES, RIGHT LATERAL BASE:  Benign prostatic tissue  E.   PROSTATE, NEEDLE CORE BIOPSIES, LEFT MID:  Benign prostatic tissue  F.   PROSTATE, NEEDLE CORE BIOPSIES, LEFT BASE:  Benign prostatic tissue  G.   PROSTATE, NEEDLE CORE BIOPSIES, LEFT LATERAL MID:  Prostatic acinar adenocarcinoma, grade group 2 (Himanshu 3+4), involving  60% of specimen  H.   PROSTATE, NEEDLE CORE BIOPSIES, LEFT LATERAL BASE:  Benign prostatic stroma, negative for glandular elements    CAM    COMMENT:  PIN-4 stains with appropriate controls are performed on blocks  A and C for further evaluation. The glands of interest show absence of a basal  cell layer on HMWCK and p63 along with faint blush-like cytoplasmic  reactivity on AMACR, supporting a diagnosis of well-differentiated  adenocarcinoma in those                           specimens.    Representative tumor block: G1.    CLINICAL HISTORY:  Elevated prostate specific antigen (PSA)    SPECIMENS RECEIVED:  A.  PROSTATE, NEEDLE CORE BIOPSIES , RIGHT MID  B.  PROSTATE, NEEDLE CORE BIOPSIES , RIGHT BASE  C.  PROSTATE, NEEDLE CORE BIOPSIES , RIGHT LATERAL MID  D.  PROSTATE, NEEDLE CORE BIOPSIES , RIGHT LATERAL BASE  E.  PROSTATE, NEEDLE CORE BIOPSIES , LEFT MID  F.  PROSTATE, NEEDLE CORE BIOPSIES , LEFT BASE  G.  PROSTATE, NEEDLE CORE BIOPSIES , LEFT LATERAL MID  H.  PROSTATE, NEEDLE CORE BIOPSIES , LEFT LATERAL BASE    MICROSCOPIC DESCRIPTION:  Tissue blocks are prepared and slides are examined microscopically on all  specimens. See diagnosis for details.    The internal and external (both positive and negative) controls reacted  appropriately. Some of our immunohistochemical and in situ hybridization  studies are performed as analyte specific reagents. The following  "statement  applies to those tests: This test was developed, and its                           performance  characteristics determined by [facility]. It has not been  cleared or approved by the US Food and Drug Administration. However, the  FDA has determined that approval and clearance are not necessary.    Professional interpretation rendered by Sofia Soto M.D., JABARI at  [facility], [address] [place] [address].    GROSS DESCRIPTION:  A.  Labeled \"right mid prostate\" consists of 1 tan skinny core measuring 1.1  cm in length, and 0.1 cm in diameter.  Submitted in 1 block.  BKO  B.  Labeled \"right base prostate\" consists of 2 tan skinny cores ranging in  length from 0.5 to 1.2 cm, and all measuring 0.1 cm in diameter.  Submitted entirely in 1 block.  C.  Labeled \"right lateral mid prostate\" consists of 1 tan skinny core measuring  1.6 cm in length, and 0.1 cm in diameter.  Submitted in 1 block.  D.  Labeled \"right lateral base prostate\" consists of 2 tan skinny cores ranging  in length from 0.6 to 0.9 cm, and all measuring 0.1 cm in                           diameter.  Submitted entirely in 1 block.  E.  Labeled \"left mid prostate\" consists of 1 tan skinny core measuring 1.4 cm  in length, and 0.1 cm in diameter.  Submitted in 1 block.  F.  Labeled \"left base prostate\" consists of 1 tan skinny core measuring 1.8 cm  in length, and 0.1 cm in diameter.  Submitted in 1 block.  G.  Labeled \"left lateral mid prostate\" consists of 1 tan skinny core measuring  1.4 cm in length, and 0.1 cm in diameter.  Submitted in 1 block.  H.  Labeled \"left lateral base prostate\" consists of 1 tan skinny core measuring  0.9 cm in length, and 0.1 cm in diameter.  Submitted in 1 block.  BKO    REVIEWED, DIAGNOSED AND ELECTRONICALLY  SIGNED BY:    Sofia Soto M.D., GINA.  CPT CODES:  88305x8, 88344x2     Hospital Outpatient Visit on 03/03/2025   Component Date Value Ref Range Status    " Creatinine 03/03/2025 1.30  0.60 - 1.30 mg/dL Final    Serial Number: [id number]:  [id number]        Procedure:       Assessment/Plan:   Lymphoma-in remission  Prostate cancer:  He returns today status post biopsy for extensive discussion of prostate cancer.  We discussed staging and grading of the disease.  I described with a Himanshu system being from a 2 to10 scale with the most common low-grade pattern being a Himanshu 6.  I discussed the staging workup including a total body bone scan and CT scan especially with a PSA greater than 10.  We discussed the various options at length. I discussed a radical retropubic prostatectomy done in the traditional fashion and using the robotic technique.  I then discussed radiation treatment both seed therapy and external beam therapy using Gold fiduciary markers.  We talked about some of the other alternatives such as a cryosurgical ablation at high intensity focused ultrasound as being viable alternatives but not recommended at this time.  He focused on aggressive watchful waiting and explained that currently low literature it's been discovered that a lot of men can actually observe it especially with numerous other comorbidities cannot have problems from the cancer by itself.  Talked about hormonal ablation and the side effects of creation of insulin resistance.  Overall, the patient was given appropriate literature, is going to think about it, and I'm going to revisit the topic with them after the next office visit.  Will initiate a staging workup with CT total body bone scan ultimately going to recommend a radiation treatments.  Oncotype DX genomic prostate score is an RNA expression assay of 12 genes involved in androgen signaling, cellular organization, stromal response and cellular proliferation.  This test is performed on biopsy samples and the GPS is scored 0 to 100 with higher numbers indicating less favorable disease.  This is an excellent prospective validation of  adverse pathology at prostatectomy or biopsy.            This document has been electronically signed by ELENA WEBSTER MD May 8, 2025 12:22 EDT    Dictated Utilizing Dragon Dictation: Part of this note may be an electronic transcription/translation of spoken language to printed text using the Dragon Dictation System.

## 2025-05-14 DIAGNOSIS — C61 PROSTATE CANCER: Primary | ICD-10-CM

## 2025-05-16 PROBLEM — R97.20 ELEVATED PROSTATE SPECIFIC ANTIGEN (PSA): Status: RESOLVED | Noted: 2024-10-03 | Resolved: 2025-05-16

## 2025-05-16 PROBLEM — C61 PROSTATE CANCER: Status: ACTIVE | Noted: 2025-05-16

## 2025-05-22 ENCOUNTER — HOSPITAL ENCOUNTER (OUTPATIENT)
Dept: NUCLEAR MEDICINE | Facility: HOSPITAL | Age: 85
Discharge: HOME | End: 2025-05-22
Payer: MEDICARE

## 2025-05-22 DIAGNOSIS — C61 PROSTATE CANCER: ICD-10-CM

## 2025-05-22 PROCEDURE — 34310000005 TECHNETIUM OXIDRONATE KIT: Performed by: UROLOGY

## 2025-05-22 PROCEDURE — A9561 TC99M OXIDRONATE: HCPCS | Performed by: UROLOGY

## 2025-05-22 PROCEDURE — 78306 BONE IMAGING WHOLE BODY: CPT

## 2025-05-22 RX ADMIN — TECHNETIUM TC 99M OXIDRONATE 1 DOSE: 3.15 INJECTION, POWDER, LYOPHILIZED, FOR SOLUTION INTRAVENOUS at 08:21

## 2025-05-30 LAB — REF LAB TEST METHOD: NORMAL

## 2025-06-10 ENCOUNTER — HOSPITAL ENCOUNTER (OUTPATIENT)
Dept: CT IMAGING | Facility: HOSPITAL | Age: 85
Discharge: HOME | End: 2025-06-10
Payer: MEDICARE

## 2025-06-10 ENCOUNTER — OFFICE VISIT (OUTPATIENT)
Dept: UROLOGY | Facility: CLINIC | Age: 85
End: 2025-06-10
Payer: MEDICARE

## 2025-06-10 VITALS
DIASTOLIC BLOOD PRESSURE: 79 MMHG | HEART RATE: 60 BPM | HEIGHT: 68 IN | SYSTOLIC BLOOD PRESSURE: 175 MMHG | WEIGHT: 155.2 LBS | BODY MASS INDEX: 23.52 KG/M2

## 2025-06-10 DIAGNOSIS — C61 PROSTATE CANCER: ICD-10-CM

## 2025-06-10 DIAGNOSIS — C61 PROSTATE CANCER: Primary | ICD-10-CM

## 2025-06-10 PROCEDURE — 74176 CT ABD & PELVIS W/O CONTRAST: CPT

## 2025-06-10 NOTE — PROGRESS NOTES
Chief Complaint:   Prostate cancer    HPI:   84 y.o. male returns today he has an Oncotype of 46 negative total body bone scan negative CT scan previous left leg lymphoma with radiation therapy I will set him up for radiation treatments      Past Medical History:     Past Medical History:   Diagnosis Date    Diabetes mellitus     Hodgkin lymphoma     Hypertension        Current Meds:     Current Outpatient Medications   Medication Sig Dispense Refill    amLODIPine (NORVASC) 10 MG tablet Take 1 tablet by mouth Daily.      ciprofloxacin (Cipro) 500 MG tablet Take one tablet twice a day before procedure (Patient not taking: Reported on 5/8/2025) 4 tablet 0    cloNIDine (CATAPRES) 0.1 MG tablet TAKE 1 TABLET BY MOUTH TWICE A DAY AS NEEDED FOR SYSTOLIC BLOOD PRESSURE >160      diazePAM (VALIUM) 10 MG tablet One tablet night before procedure at bedtime and one morning of one hour prior to procedure (Patient not taking: Reported on 5/8/2025) 2 tablet 0    Farxiga 10 MG tablet Take 10 mg by mouth Daily.      furosemide (LASIX) 40 MG tablet Take 1 tablet by mouth Daily.      gemfibrozil (LOPID) 600 MG tablet Take 1 tablet by mouth Daily.      glimepiride (AMARYL) 4 MG tablet Take 1 tablet by mouth Daily.      metFORMIN (FORTAMET) 500 MG (OSM) 24 hr tablet 1 tablet.      olmesartan (BENICAR) 40 MG tablet Take 1 tablet by mouth Daily.      potassium chloride (MICRO-K) 10 MEQ CR capsule TAKE 2 CAPSULES BY MOUTH EVERY MORNING & TAKE 1 CAPSULE BY MOUTH EVERY EVENING      Sodium Phosphates ([employer] Enema Disposable) 19-7 GM/118ML enema Use morning of the procedure 1 enema 0    tamsulosin (FLOMAX) 0.4 MG capsule 24 hr capsule TAKE 1 CAPSULE BY MOUTH ONCE DAILY 30 MINUTES AFTER MEAL      traZODone (DESYREL) 100 MG tablet Take 1 tablet by mouth At Night As Needed.      vitamin D (ERGOCALCIFEROL) 1.25 MG (79940 UT) capsule capsule Take 1 capsule by mouth 1 (One) Time Per Week.       No current facility-administered medications for this  visit.        Allergies:      Allergies   Allergen Reactions    Ct Contrast Hives        Past Surgical History:     No past surgical history on file.    Social History:     Social History     Socioeconomic History    Marital status:    Tobacco Use    Smoking status: Never     Passive exposure: Never    Smokeless tobacco: Never   Vaping Use    Vaping status: Never Used   Substance and Sexual Activity    Alcohol use: Never    Drug use: Never    Sexual activity: Defer       Family History:     Family History   Problem Relation Age of Onset    Cancer Father     No Known Problems Mother        Review of Systems:     Review of Systems   Constitutional: Negative.    HENT: Negative.     Eyes: Negative.    Respiratory: Negative.     Cardiovascular: Negative.    Gastrointestinal: Negative.    Endocrine: Negative.    Musculoskeletal: Negative.    Allergic/Immunologic: Negative.    Neurological: Negative.    Hematological: Negative.    Psychiatric/Behavioral: Negative.         Physical Exam:     Physical Exam  Vitals and nursing note reviewed.   Constitutional:       Appearance: He is well-developed.   HENT:      Head: Normocephalic and atraumatic.   Eyes:      Conjunctiva/sclera: Conjunctivae normal.      Pupils: Pupils are equal, round, and reactive to light.   Cardiovascular:      Rate and Rhythm: Normal rate and regular rhythm.      Heart sounds: Normal heart sounds.   Pulmonary:      Effort: Pulmonary effort is normal.      Breath sounds: Normal breath sounds.   Abdominal:      General: Bowel sounds are normal.      Palpations: Abdomen is soft.   Musculoskeletal:         General: Normal range of motion.      Cervical back: Normal range of motion.   Skin:     General: Skin is warm and dry.   Neurological:      Mental Status: He is alert and oriented to person, place, and time.      Deep Tendon Reflexes: Reflexes are normal and symmetric.   Psychiatric:         Behavior: Behavior normal.         Thought Content:  Thought content normal.         Judgment: Judgment normal.         I have reviewed the following portions of the patient's history: Allergies, current medications, past family history, past medical history, past social history, past surgical history, problem list, and ROS and confirm it is accurate.    Recent Image (CT and/or KUB):      CT Abdomen and Pelvis: No results found for this or any previous visit.       CT Stone Protocol: Results for orders placed during the hospital encounter of 06/10/25    CT Abdomen Pelvis Stone Protocol    Narrative  EXAM:  CT Abdomen and Pelvis Without Intravenous Contrast    EXAM DATE:  6/10/2025 10:45 AM    CLINICAL HISTORY:  PROSTATE CANCER; [id number] neoplasm of prostate    TECHNIQUE:  Axial computed tomography images of the abdomen and pelvis without  intravenous contrast.  Sagittal and coronal reformatted images were  created and reviewed.  This CT exam was performed using one or more of  the following dose reduction techniques:  automated exposure control,  adjustment of the mA and/or kV according to patient size, and/or use of  iterative reconstruction technique.    COMPARISON:  No relevant prior studies available.    FINDINGS:  Lung bases:  Unremarkable as visualized.  No mass.  No consolidation.    ABDOMEN:  Liver:  Unremarkable as visualized.  Gallbladder and bile ducts:  Unremarkable as visualized.  No calcified  stones.  No ductal dilation.  Pancreas:  Unremarkable as visualized.  No ductal dilation.  Spleen:  Unremarkable as visualized.  No splenomegaly.  Adrenals:  Unremarkable as visualized.  No mass.  Kidneys and ureters:  Unremarkable as visualized.  No obstructing  stones.  No hydronephrosis.  Stomach and bowel:  Bilateral inguinal hernias both containing  nondilated bowel.  No mucosal thickening.    PELVIS:  Appendix:  No findings to suggest acute appendicitis.  Bladder:  Unremarkable as visualized.  No stones.  Reproductive:  Unremarkable as  visualized.    ABDOMEN and PELVIS:  Intraperitoneal space:  Unremarkable as visualized.  No free air.  No  significant fluid collection.  Bones/joints:  Degenerative disc disease throughout the lumbar spine.  Degenerative facet arthropathy throughout the lumbar spine, most  prominent in the lower lumbar spine.  No acute fracture.  No  dislocation.  Soft tissues:  See above.  Vasculature:  Atherosclerotic disease.  No abdominal aortic aneurysm.  Lymph nodes:  Unremarkable as visualized.  No enlarged lymph nodes.  Other findings:  Nonspecific mesenteric stranding noted.    Impression  1.  Bilateral inguinal hernias both containing nondilated bowel.  2.  Degenerative changes lumbar spine as described.    This report was finalized on 6/10/2025 10:46 AM by Dr. Kelvin Palacios MD.       KUB: No results found for this or any previous visit.       Labs (past 3 months):           Procedure:       Assessment/Plan:   Prostate cancer:  He returns today status post biopsy for extensive discussion of prostate cancer.  We discussed staging and grading of the disease.  I described with a Mico system being from a 2 to10 scale with the most common low-grade pattern being a Mico 6.  I discussed the staging workup including a total body bone scan and CT scan especially with a PSA greater than 10.  We discussed the various options at length. I discussed a radical retropubic prostatectomy done in the traditional fashion and using the robotic technique.  I then discussed radiation treatment both seed therapy and external beam therapy using Gold fiduciary markers.  We talked about some of the other alternatives such as a cryosurgical ablation at high intensity focused ultrasound as being viable alternatives but not recommended at this time.  He focused on aggressive watchful waiting and explained that currently low literature it's been discovered that a lot of men can actually observe it especially with numerous other comorbidities  cannot have problems from the cancer by itself.  Talked about hormonal ablation and the side effects of creation of insulin resistance.  Overall, the patient was given appropriate literature, is going to think about it, and I'm going to revisit the topic with them after the next office visit.  He had a negative total body bone scan negative CT scan and an Oncotype of 46 I will set him up for radiation          This document has been electronically signed by ELENA WEBSTER MD Tyra 10, 2025 11:21 EDT    Dictated Utilizing Dragon Dictation: Part of this note may be an electronic transcription/translation of spoken language to printed text using the Dragon Dictation System.

## 2025-06-17 DIAGNOSIS — C61 PROSTATE CANCER: Primary | ICD-10-CM

## 2025-06-30 DIAGNOSIS — C61 PROSTATE CANCER: Primary | ICD-10-CM

## 2025-07-08 ENCOUNTER — TELEPHONE (OUTPATIENT)
Dept: UROLOGY | Facility: CLINIC | Age: 85
End: 2025-07-08
Payer: MEDICARE

## 2025-07-08 DIAGNOSIS — C61 PROSTATE CANCER: Primary | ICD-10-CM

## 2025-07-08 NOTE — TELEPHONE ENCOUNTER
Caller: Jordy Hooks     Relationship: SELF    Best call back number: [phone]     What is the best time to reach you: ANYTIME    Who are you requesting to speak with (clinical staff, provider,  specific staff member): CLINICAL    Do you know the name of the person who called: INCOMING CALL    What was the call regarding: PT WAS REFERRED TO DR RANDHAWA IN [place]. PT STATES HE HAS DONE RADIATION PREVIOUSLY IN [place] AND DOES NOT WANT TO GO TO [place].    PLEASE REVIEW AND GIVE PT A CALL BACK.     Is it okay if the provider responds through [employer]: NO

## 2025-07-08 NOTE — TELEPHONE ENCOUNTER
Patient wishes to be seen here at [facility] instead of [place].  He has a long drive from [facility] and does not believe he can make the drive to [place].  Will refer him to radiation oncology here.  He verbalized understanding

## 2025-07-15 ENCOUNTER — HOSPITAL ENCOUNTER (OUTPATIENT)
Facility: HOSPITAL | Age: 85
Setting detail: RADIATION/ONCOLOGY SERIES
End: 2025-07-15
Payer: MEDICARE

## 2025-07-17 ENCOUNTER — OFFICE VISIT (OUTPATIENT)
Age: 85
End: 2025-07-17
Payer: MEDICARE

## 2025-07-17 VITALS
HEART RATE: 76 BPM | OXYGEN SATURATION: 95 % | BODY MASS INDEX: 23.39 KG/M2 | RESPIRATION RATE: 20 BRPM | WEIGHT: 154.3 LBS | TEMPERATURE: 98.1 F | HEIGHT: 68 IN

## 2025-07-17 DIAGNOSIS — C61 PROSTATE CANCER: Primary | ICD-10-CM

## 2025-07-17 PROCEDURE — G0463 HOSPITAL OUTPT CLINIC VISIT: HCPCS

## 2025-07-17 RX ORDER — HYDRALAZINE HYDROCHLORIDE 25 MG/1
25 TABLET, FILM COATED ORAL 2 TIMES DAILY WITH MEALS
COMMUNITY
Start: 2025-06-12

## 2025-07-17 RX ORDER — IBUPROFEN 800 MG/1
TABLET, FILM COATED ORAL
COMMUNITY
Start: 2025-06-12 | End: 2025-07-17

## 2025-07-17 RX ORDER — CYANOCOBALAMIN 1000 UG/ML
1000 INJECTION, SOLUTION INTRAMUSCULAR; SUBCUTANEOUS
COMMUNITY
Start: 2025-06-17

## 2025-07-17 RX ORDER — HYDROCODONE BITARTRATE AND ACETAMINOPHEN 5; 325 MG/1; MG/1
1 TABLET ORAL EVERY 12 HOURS PRN
COMMUNITY
Start: 2025-06-12 | End: 2025-07-17

## 2025-07-17 RX ORDER — BLOOD SUGAR DIAGNOSTIC
STRIP MISCELLANEOUS
COMMUNITY
Start: 2025-06-12

## 2025-07-17 NOTE — PROGRESS NOTES
CONSULTATION NOTE      :                                                          1940  DATE OF CONSULTATION:                       2025   REQUESTING PHYSICIAN:                   Festus Menon, *  REASON FOR CONSULTATION:           Prostate cancer  - Stage IIB (cT2c, cN0, cM0, PSA: 10.8, Grade Group: 2)         BRIEF HISTORY:  The patient is a very pleasant 84 y.o. male  with recent diagnosis of prostate cancer.  He has a history of lymphoma treated from  through  with chemotherapy in [facility] and involved field radiotherapy in [facility].  He apparently was treated to the left groin and left axilla.  He has been in disease remission status from the lymphoma.  He recently presented with rising PSA values of 9.3 ng/ml on 10/3/2024, 8.6 ng/mL on 2025 and 10.76 ng/mL on 2025.  MRI 3/3/2025 showed 46.38 cc prostate gland.  Is a PI-RADS 4 index lesion of the left peripheral zone measuring 2.4 x 0.9 cm.  There was marrow change in the pelvis and bilateral inguinal hernias.  CT abdomen pelvis showed bilateral inguinal hernias.  There was degenerative changes in the lumbar spine but no destructive mass.  Nuclear medicine bone scan showed no evidence of metastasis.  Biopsy 2025 showed presence of prostatic adenocarcinoma bilaterally.  2 out of 4 cores from the right lobe contained neoplasm.  [place]'s 3+3 = 6 was present in 100% of the tissue core from the right mid gland and 15% of tissue core from the right lateral mid.  1 out of 4 cores from the left lobe contained [place]'s 3+4 = 7 at the left lateral mid gland involving 60% tissue.  He is very healthy and active.  He could have predicted longevity greater than 10 years.      Allergy:   Allergies   Allergen Reactions    Ct Contrast Hives       Social History:   Social History     Socioeconomic History    Marital status:    Tobacco Use    Smoking status: Never     Passive exposure: Never    Smokeless  tobacco: Never   Vaping Use    Vaping status: Never Used   Substance and Sexual Activity    Alcohol use: Never    Drug use: Never    Sexual activity: Defer       Past Medical History:   Past Medical History:   Diagnosis Date    Diabetes mellitus     Hodgkin lymphoma     Hypertension     Prostate cancer     Skin cancer 11/08/2023    SCC       Family History: family history includes Breast cancer in his sister; Cancer in his father; Diabetes in his father and mother.     Surgical History:   Past Surgical History:   Procedure Laterality Date    COLONOSCOPY  06/2025    PROSTATE BIOPSY  05/01/2025    SKIN CANCER EXCISION  11/08/2023    right neck squamous cell carcinoma        Review of Systems:   Review of Systems   Cardiovascular:  Positive for leg swelling.   Musculoskeletal:  Positive for back pain.           IPSS Questionnaire (AUA-7):  Over the past month…    1)  Incomplete Emptying  How often have you had a sensation of not emptying your bladder?  0 - Not at all   2)  Frequency  How often have you had to urinate less than every two hours? 1 - Less than 1 time in 5   3)  Intermittency  How often have you found you stopped and started again several times when you urinated?  0 - Not at all   4) Urgency  How often have you found it difficult to postpone urination?  2 - Less than half the time   5) Weak Stream  How often have you had a weak urinary stream?  0 - Not at all   6) Straining  How often have you had to push or strain to begin urination?  0 - Not at all   7) Nocturia  How many times did you typically get up at night to urinate?  0 - None   Total Score:  3       Quality of life due to urinary symptoms:  If you were to spend the rest of your life with your urinary condition the way it is now, how would you feel about that? 2-Mostly Satisfied   Urine Leakage (Incontinence) 0-No Leakage     Sexual Health Inventory  Current Status    1)  How do you rate your confidence that you could achieve and keep an erection?  "1-Very Low   2) When you had erections with sexual stimulation, how often were your erections hard enough for penetration (entering your partner)? 0-No sexual activity   3)  During sexual intercourse, how often were you able to maintain your erection after you had penetrated (entered) into your partner? 0-Did not attempt intercourse   4) During sexual intercourse, how difficult was it to maintain your erection to completion of intercourse? 0-Did not attempt intercourse   5) When you attempted sexual intercourse, how often was it satisfactory to you? 0-No sexual activity   Total Score: 1       Bowel Health Inventory  Current Status: 0-No problems, no rectal bleeding, no discharge, less then 5 bowel movements a day          Objective   VITAL SIGNS:   Vitals:    07/17/25 1413   Pulse: 76   Resp: 20   Temp: 98.1 °F (36.7 °C)   TempSrc: Skin   SpO2: 95%   Weight: 70 kg (154 lb 4.8 oz)   Height: 172.7 cm (68\")   PainSc: 0-No pain            KSP %:  80    Physical Exam:   Physical Exam  Vitals and nursing note reviewed.   Constitutional:       Appearance: He is well-developed.   HENT:      Head: Normocephalic and atraumatic.   Cardiovascular:      Rate and Rhythm: Normal rate and regular rhythm.      Heart sounds: Normal heart sounds. No murmur heard.  Pulmonary:      Effort: Pulmonary effort is normal.      Breath sounds: Normal breath sounds. No wheezing or rales.   Abdominal:      General: Bowel sounds are normal. There is no distension.      Palpations: Abdomen is soft.      Tenderness: There is no abdominal tenderness.   Genitourinary:     Prostate: Enlarged (30 to 40 cc, right lobe is larger.  There is a firm 1 cm raised nodule at the left mid to apex.  Seminal vesicles are nonpalpable.) and nodules present. Not tender.      Rectum: No mass, tenderness, anal fissure, external hemorrhoid or internal hemorrhoid. Normal anal tone.   Musculoskeletal:         General: No tenderness. Normal range of motion.      Cervical " back: Normal range of motion and neck supple.      Left lower leg: Edema (1+ ankle) present.   Lymphadenopathy:      Cervical: No cervical adenopathy.      Upper Body:      Right upper body: No supraclavicular adenopathy.      Left upper body: No supraclavicular adenopathy.   Skin:     General: Skin is warm and dry.   Neurological:      Mental Status: He is alert and oriented to person, place, and time.      Sensory: No sensory deficit.   Psychiatric:         Behavior: Behavior normal.         Thought Content: Thought content normal.         Judgment: Judgment normal.              The following portions of the patient's history were reviewed and updated as appropriate: allergies, current medications, past family history, past medical history, past social history, past surgical history, and problem list.    Assessment:   Assessment      Prostate cancer, [place]'s 3+4 = 7, clinical stage IIB (T2c, N0, M0), PSA 10.76 ng/mL.  He has favorable intermediate risk disease.  We discussed radiotherapy treatment options utilizing stereotactic body radiotherapy versus intensity-modulated radiotherapy or brachytherapy.  He would like to proceed with stereotactic body radiotherapy on every other day treatment schedule.  This to be very reasonable for him given his stage of disease and distance of travel.  The CyberKnife treatment procedure has been reviewed in detail.  Informed consent was obtained.    RECOMMENDATIONS: Gold seed fiducial placement.  Patient would like this to be performed by Dr. Romero sometime in September 2025.  He will subsequently return for treatment planning CT and MRI of the pelvis to be treated with SBRT sometime in October 2025.  The prostate gland and proximal seminal vesicles were received 5 fractions of 7 Gray, delivered on every other day treatment schedule.    I spent a total of 55 minutes on todays visit, with more than 45 minutes in direct face to face communication, and the remainder of the time  spent in reviewing the relevant history, records, available imaging, and for documentation.    Follow Up:   Return in about 2 months (around 9/17/2025) for Office Visit, Simulation.  Diagnoses and all orders for this visit:    1. Prostate cancer (Primary)  -     Ambulatory Referral to ONC Social Work  -     Ambulatory Referral to Urology         Reji Brown MD

## 2025-07-21 ENCOUNTER — TELEPHONE (OUTPATIENT)
Dept: RADIATION ONCOLOGY | Facility: HOSPITAL | Age: 85
End: 2025-07-21
Payer: MEDICARE

## 2025-07-21 NOTE — TELEPHONE ENCOUNTER
OUTGOING REFERRAL TO DR. GONZÁLES FOR [facility]IDUCIAL PLACEMENT SUCCESSFULLY FAXED. 7-18-25 @ 2:31PM  Calvin  @ Dr. Gonzáles's confirmed it has been rec'd. 7-21.25 @ 12:38pm

## 2025-07-22 DIAGNOSIS — C61 PROSTATE CANCER: Primary | ICD-10-CM

## 2025-07-23 ENCOUNTER — TELEPHONE (OUTPATIENT)
Dept: RADIATION ONCOLOGY | Facility: HOSPITAL | Age: 85
End: 2025-07-23
Payer: MEDICARE

## 2025-07-23 NOTE — TELEPHONE ENCOUNTER
- Rec'd fax appt confirmation 7/22/25 @ 2:44pm    - Patient is scheduled w/ Dr. Romero on 9/3/25 @ 9:30am    - Emailed to [facility] in [place] to inform patient. 7/23/25 @ 11:32am-

## 2025-07-29 DIAGNOSIS — C61 PROSTATE CANCER: Primary | ICD-10-CM
